# Patient Record
Sex: FEMALE | Race: WHITE | URBAN - METROPOLITAN AREA
[De-identification: names, ages, dates, MRNs, and addresses within clinical notes are randomized per-mention and may not be internally consistent; named-entity substitution may affect disease eponyms.]

---

## 2017-01-03 ENCOUNTER — OFFICE VISIT (OUTPATIENT)
Dept: URGENT CARE | Facility: URGENT CARE | Age: 47
End: 2017-01-03
Payer: COMMERCIAL

## 2017-01-03 VITALS
BODY MASS INDEX: 29.22 KG/M2 | TEMPERATURE: 98.3 F | HEART RATE: 98 BPM | DIASTOLIC BLOOD PRESSURE: 68 MMHG | SYSTOLIC BLOOD PRESSURE: 118 MMHG | OXYGEN SATURATION: 96 % | WEIGHT: 152.2 LBS
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
## 2017-01-03 DIAGNOSIS — S91.051A CAT BITE OF ANKLE, RIGHT, INITIAL ENCOUNTER: Primary | ICD-10-CM

## 2017-01-03 PROCEDURE — 99213 OFFICE O/P EST LOW 20 MIN: CPT | Performed by: PHYSICIAN ASSISTANT

## 2017-01-04 NOTE — NURSING NOTE
"Chief Complaint   Patient presents with     Trauma     cat bite       Initial /68 mmHg  Pulse 98  Temp(Src) 98.3  F (36.8  C) (Oral)  Wt 152 lb 3.2 oz (69.037 kg)  SpO2 96% Estimated body mass index is 29.22 kg/(m^2) as calculated from the following:    Height as of 4/29/16: 5' 0.5\" (1.537 m).    Weight as of this encounter: 152 lb 3.2 oz (69.037 kg).  BP completed using cuff size: fatou LINDQUIST CMA ([facility])  7:30 PM 1/3/2017      "

## 2017-01-04 NOTE — PROGRESS NOTES
SUBJECTIVE:                                                    Natasha Bruce is a 46 year old female who presents to clinic today for the following health issues:    Cat bite on right lower leg, previously a stray but has been taken to the vet and had rabies shot 2 weeks ago.   Bit her right posterior lower leg/calf/ankle yest.    Last tetanus shot 2010  No Known Allergies    Past Medical History   Diagnosis Date     Dysmenorrhea      Uterine myoma      Dysmenorrhea          No current outpatient prescriptions on file prior to visit.  No current facility-administered medications on file prior to visit.    Social History   Substance Use Topics     Smoking status: Current Some Day Smoker     Smokeless tobacco: Never Used     Alcohol Use: No       ROS:  General: negative for fever  SKIN: + as above    Physcial Exam:  /68 mmHg  Pulse 98  Temp(Src) 98.3  F (36.8  C) (Oral)  Wt 152 lb 3.2 oz (69.037 kg)  SpO2 96%    GENERAL: alert, no acute distress  EYES: conjunctival clear  RESP: Regular breathing rate  NEURO: awake .  SKIN: ;eft posterior lower calf with 5 puncture marks. Very mild erythema of skin. Warm, tender. Circ./sensation intact.    ASSESSMENT:    ICD-10-CM    1. Cat bite of ankle, right, initial encounter S91.051A amoxicillin-clavulanate (AUGMENTIN) 875-125 MG per tablet    W55.01XA        PLAN:WArm soaks with Dreft daily. RETURN TO CLINIC prn. Watch for e/o infection.  See today's orders.  Follow-up with primary clinic if not improving.  Advised about symptoms which might herald more serious problems.    Shaina Asif PA-C